# Patient Record
Sex: FEMALE | Race: WHITE | NOT HISPANIC OR LATINO | Employment: OTHER | URBAN - METROPOLITAN AREA
[De-identification: names, ages, dates, MRNs, and addresses within clinical notes are randomized per-mention and may not be internally consistent; named-entity substitution may affect disease eponyms.]

---

## 2024-11-11 ENCOUNTER — APPOINTMENT (OUTPATIENT)
Dept: NEUROLOGY | Facility: CLINIC | Age: 89
End: 2024-11-11
Payer: MEDICARE

## 2024-11-11 VITALS
SYSTOLIC BLOOD PRESSURE: 174 MMHG | DIASTOLIC BLOOD PRESSURE: 97 MMHG | HEART RATE: 64 BPM | RESPIRATION RATE: 18 BRPM | WEIGHT: 157 LBS

## 2024-11-11 DIAGNOSIS — G20.C PARKINSONISM, UNSPECIFIED PARKINSONISM TYPE (MULTI): Primary | ICD-10-CM

## 2024-11-11 PROCEDURE — 1160F RVW MEDS BY RX/DR IN RCRD: CPT | Performed by: PSYCHIATRY & NEUROLOGY

## 2024-11-11 PROCEDURE — 1159F MED LIST DOCD IN RCRD: CPT | Performed by: PSYCHIATRY & NEUROLOGY

## 2024-11-11 PROCEDURE — 1036F TOBACCO NON-USER: CPT | Performed by: PSYCHIATRY & NEUROLOGY

## 2024-11-11 PROCEDURE — 99205 OFFICE O/P NEW HI 60 MIN: CPT | Performed by: PSYCHIATRY & NEUROLOGY

## 2024-11-11 RX ORDER — IPRATROPIUM BROMIDE 42 UG/1
SPRAY, METERED NASAL
COMMUNITY

## 2024-11-11 RX ORDER — BUSPIRONE HYDROCHLORIDE 5 MG/1
5 TABLET ORAL 3 TIMES DAILY
COMMUNITY
Start: 2024-01-10

## 2024-11-11 RX ORDER — BISOPROLOL FUMARATE 5 MG/1
1 TABLET, FILM COATED ORAL
COMMUNITY
Start: 2024-02-02

## 2024-11-11 RX ORDER — NEBIVOLOL 10 MG/1
10 TABLET ORAL DAILY
COMMUNITY

## 2024-11-11 RX ORDER — CARBOXYMETHYLCELLULOSE SODIUM 5 MG/ML
1 SOLUTION/ DROPS OPHTHALMIC 4 TIMES DAILY
COMMUNITY
Start: 2023-09-07

## 2024-11-11 RX ORDER — LEVODOPA AND CARBIDOPA 95; 23.75 MG/1; MG/1
3 CAPSULE, EXTENDED RELEASE ORAL 3 TIMES DAILY
Qty: 200 CAPSULE | Refills: 0 | COMMUNITY
Start: 2024-11-11

## 2024-11-11 RX ORDER — ESCITALOPRAM OXALATE 20 MG/1
1 TABLET ORAL
COMMUNITY
Start: 2024-04-01

## 2024-11-11 RX ORDER — OMEPRAZOLE 20 MG/1
20 TABLET, DELAYED RELEASE ORAL DAILY
COMMUNITY

## 2024-11-11 RX ORDER — HYPROMELLOSE 2910 5 MG/ML
SOLUTION OPHTHALMIC
COMMUNITY
Start: 2024-05-17

## 2024-11-11 RX ORDER — GABAPENTIN 100 MG/1
100 CAPSULE ORAL
COMMUNITY
Start: 2024-03-05

## 2024-11-11 RX ORDER — CARBIDOPA AND LEVODOPA 25; 100 MG/1; MG/1
TABLET ORAL
COMMUNITY
End: 2024-11-11 | Stop reason: ALTCHOICE

## 2024-11-11 RX ORDER — BACLOFEN 5 MG/1
5 TABLET ORAL NIGHTLY
COMMUNITY

## 2024-11-11 RX ORDER — DICLOFENAC SODIUM 10 MG/G
GEL TOPICAL
COMMUNITY
Start: 2024-06-18

## 2024-11-11 ASSESSMENT — PATIENT HEALTH QUESTIONNAIRE - PHQ9
SUM OF ALL RESPONSES TO PHQ9 QUESTIONS 1 AND 2: 0
2. FEELING DOWN, DEPRESSED OR HOPELESS: NOT AT ALL
1. LITTLE INTEREST OR PLEASURE IN DOING THINGS: NOT AT ALL

## 2024-11-11 ASSESSMENT — UNIFIED PARKINSONS DISEASE RATING SCALE (UPDRS)
KINETIC_TREMOR_RIGHTHAND: 0
AMPLITUDE_LLE: 0
TOETAPPING_RIGHT: 0
POSTURAL_STABILITY: 2
CONSTANCY_TREMOR_ATREST: 0
RIGIDITY_NECK: 1
PARKINSONS_MEDS: YES
HANDMOVEMENTS_RIGHT: 1
FINGER_TAPPING_LEFT: 3
CHAIR_RISING_SCALE: 3
TOTAL_SCORE: 34
PRONATION_SUPINATION_LEFT: 3
HOEHN_YAHR: 3
FREEZING_GAIT: 0
POSTURAL_TREMOR_RIGHTHAND: 1
LEVODOPA: YES
RIGIDITY_RUE: 2
LEG_AGILITY_LEFT: 1
SPONTANEITY_OF_MOVEMENT: 2
DYSKINESIAS_PRESENT: NO
POSTURE: 1
RIGIDITY_LLE: 1
RIGIDITY_LUE: 2
SPEECH: 1
PRONATION_SUPINATION_RIGHT: 0
KINETIC_TREMOR_LEFTHAND: 0
RIGIDITY_RLE: 1
GAIT: 2
LEG_AGILITY_RIGHT: 0
AMPLITUDE_LUE: 0
TOETAPPING_LEFT: 1
AMPLITUDE_RUE: 0
POSTURAL_TREMOR_LEFTHAND: 1
MINUTES_SINCE_LEVODOPA: 240
AMPLITUDE_RLE: 0
CLINICAL_STATE: ON
FACIAL_EXPRESSION: 1
FINGER_TAPPING_RIGHT: 2
AMPLITUDE_LIP_JAW: 0

## 2024-11-11 ASSESSMENT — ENCOUNTER SYMPTOMS
OCCASIONAL FEELINGS OF UNSTEADINESS: 1
DEPRESSION: 0
LOSS OF SENSATION IN FEET: 1

## 2024-11-11 NOTE — PATIENT INSTRUCTIONS
Parkinsonism, possible Parkinson's disease   Possible mild R foot drop from lumbar radiculopathy    --Stop carbidopa/levodopa 25/100   --Try Rytary 23.75/95mg 2 tabs 3x/day for 1 week then 3 tabs 3x/day  --Follow up with Dr. Forman

## 2024-11-11 NOTE — LETTER
November 11, 2024     Rut Meehan MD  [id number]  [address]  [facility] [address]    Patient: Darrel León   YOB: 1935   Date of Visit: 11/11/2024       Dear Dr. Rut Meehan MD:    Thank you for referring Darrel León to me for evaluation. Below are my notes for this consultation.  If you have questions, please do not hesitate to call me. I look forward to following your patient along with you.       Sincerely,     Tyler Dueñas MD      CC: No Recipients  ______________________________________________________________________________________    [facility]     Subjective     Darrel León is a   89 y.o. year old female who presents with parkinsonism. Patient is accompanied by: child her daughter  and son.  Her daughter translated.    Visit type: new patient visit - second opinion    HPI    Patient Health Questionnaire-2 Score: 0      July 2023 she had onset of gait disorder, low back pain, and weakness of the legs.  Poor balance.  She fell several times and uses a walker.  In 2021 she had started to fall.  She shuffles her feet.  No hypophonia.  Immobility.  Some micrographia.  Maybe R hand tremor at the time.      carbidopa/levodopa 25/100 1 tab TID.  The mouth gets dry.  No benefit.  Dr. Forman tried raising it to 1.5 tabs TID, dry mouth got worse.  She feels stiff in the low back.  Some LBP.  No shooting pains.  No numbness.    She lives alone.  Some anosmia.    Memory and cognition are normal.  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    PMH: htn, cardiac catheterization, hyperlipidemia    There is no problem list on file for this patient.     No past medical history on file.   No past surgical history on file.   Social History     Socioeconomic History   • Marital status:      Spouse name: Not on file   • Number of children: Not on file   • Years of education: Not on file   • Highest education level: Not on file   Occupational History   •  Not on file   Tobacco Use   • Smoking status: Never   • Smokeless tobacco: Never   Substance and Sexual Activity   • Alcohol use: Not on file   • Drug use: Not on file   • Sexual activity: Not on file   Other Topics Concern   • Not on file   Social History Narrative   • Not on file     Social Drivers of Health     Financial Resource Strain: Not on file   Food Insecurity: Not on file   Transportation Needs: Not on file   Physical Activity: Not on file   Stress: Not on file   Social Connections: Not on file   Intimate Partner Violence: Not on file   Housing Stability: Not on file      No family history on file.   Patient Health Questionnaire-2 Score: 0          Review of Systems  All other system have been reviewed and are negative for complaint.  Objective     Vitals:    11/11/24 1620 11/11/24 1624   BP: 180/80 (!) 174/97   BP Location: Left arm Left arm   Patient Position: Sitting Standing   BP Cuff Size: Large adult Large adult   Pulse:  64   Resp: 18    Weight: 71.2 kg (157 lb)         Neurological Exam      GENERAL APPEARANCE:  No distress, alert and cooperative.     CARDIOVASCULAR: Radial pulses +2     MENTAL STATE:  Orientation was normal to time, place and person. Recent and remote memory was grossly intact.  Attention span and concentration were normal. Language was Brazilian. Calculation was generally intact.     OPHTHALMOSCOPIC: Deferred due to Covid19    CRANIAL NERVES:  Cranial nerves were normal.      CN 2- MARLEE, visual fields full to confrontation.      CN 3, 4, 6-  EOMs normal alignment, full range of movement, no nystagmus     CN 5- Facial sensation intact bilaterally.      CN 7- Normal and symmetric facial strength. Nasolabial folds symmetric.     CN 8- Hearing intact to finger rub.      CN 9- Palate elevates symmetrically.      CN 11- Normal strength of shoulder shrug and neck turning      CN 12- Tongue midline, with normal bulk and strength; no fasciculations.     MOTOR:  Motor exam was normal.  Muscle bulk was normal in both upper and lower extremities. Muscle strength was 5/5 in distal and proximal muscles in both upper and lower extremities. No fasciculations, tremor or other abnormal movements were present.     REFLEXES:  Right/ Left:  Biceps 2+/2, brachioradialis 2+/2, triceps 2+/2, patellar 2++/2+, ankle 1/1  Babinski: toes downgoing to plantar stimulation. No clonus, frontal release signs or other pathologic reflexes present.     SENSORY: Sensory exam was intact to light touch, sharp/dull, vibration     COORDINATION:  finger-nose-finger was intact     GAIT: She stood with difficulty, walked with a widened, shuffling, antalgic gait, with some steppage component on the right side suggestive of some subclinical foot drop on the right.  MDS UPDRS 1st Score: Motor Examination  Is the patient on medication for treating the symptoms of Parkinson's Disease?: Yes  Patients receiving medication for treating the symptoms of Parkinson's Disease, daryl the patient's clinical state.: On  Is the patient on Levodopa?: Yes  Minutes since last Levodopa dose: 240  Speech: 1  Facial Expression: 1  Rigidty Neck: 1  Rigidty RUE: 2  Rigidity - LUE: 2  Rigidity RLE: 1  Rigidity LLE: 1  Finger Tapping Right Hand: 2  Finger Tapping Left Hand: 3  Hand Movements- Right Hand: 1  Hand Movements- Left Hand: 2  Pronatiaon-Supination Movments - Right Hand: 0  Pronatiaon-Supination Movments Left Hand: 3  Toe Tapping Right Foot: 0  Toe Tapping - Left Foot: 1  Leg Agility - Right Le  Leg Agility - Left le  Arising from Chair: 3  Gait: 2  Freezing of Gait: 0  Postural Stability: 2  Posture: 1  Global Spontanteity of Movment ( Body Bradykinesia): 2  Postural Tremor - Right Hand: 1  Postural Tremor - Left hand: 1  Kinetic Tremor - Right hand: 0  Kinetic Tremor - Left hand: 0  Rest Tremor Amplitude - RUE: 0  Rest Tremor Amplitude - LUE: 0  Rest Tremor Amplitude - RLE: 0  Rest Tremor Amplitude - LLE: 0  Rest Tremor Amplitude -  Lip/Jaw: 0  Constancy of Rest Tremor: 0  MDS UPDRS Total Score: 34  Were dyskinesias (chorea or dystonia) present during examination?: No  Hoen and Yahr Stage: 3           Assessment/Plan       Darrel León is a 89 y.o. year old female here for a second opinion, for parkinsonism.  She sees Dr. Forman at [facility].  Dr. Forman did a DaTscan that was positive, and was unable to titrate levodopa because of dry mouth.  I asked her whether this was severe enough that it is dose-limiting, and she thought that it is.  We therefore decided to try Rytary, since she has dry mouth after each dose, I was interested whether a slow release formulation would circumvent this side effect and proved to be titratable.  If the samples I gave her work out, then I offered she could call the office and we could send in a prescription and get PA.  I prescribed Rytary 95 mg, to be titrated up to 3 tabs 3 times daily after discontinuing her Sinemet.  She has already done extensive physical therapy, and I suggested possibility of occupational therapy at [facility] for this mild foot drop.  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  She does have very mild right-sided hyperreflexia, but that can happen with PD and her brain scan report was unrevealing.    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  Parkinson's disease remains a possibility.  Her family asked about stiff person syndrome.  She does not have the classic gait, and her low back stiffness and pain may be better explained by her lumbar spine pathology.  Nevertheless, I deferred to Dr. Forman about whether to send LORENZO and  amphiphysin antibodies if she fails to respond to a higher levodopa dose.  Diagnostically, I brought up  skin punch biopsy as a confirmatory test, but she did not seem so interested.    Overall, I agreed with her management to date.  She will follow-up with her neurologist.    Recommendations:  --Stop carbidopa/levodopa 25/100   --Try Rytary 23.75/95mg 2 tabs 3x/day for 1 week then 3 tabs 3x/day  --Follow up with Dr. Dasia Dueñas MD, FAAN  [facility]  [facility]  , Neurology  [facility]       60 minutes total were spent reviewing prior medical records, reviewing MRI reports, face-to-face with the patient, and on documentation, as well as obtaining and logging samples.

## 2024-11-11 NOTE — PROGRESS NOTES
[facility]     Subjective     Darrel León is a   89 y.o. year old female who presents with parkinsonism. Patient is accompanied by: child her daughter  and son.  Her daughter translated.    Visit type: new patient visit - second opinion    HPI    Patient Health Questionnaire-2 Score: 0      July 2023 she had onset of gait disorder, low back pain, and weakness of the legs.  Poor balance.  She fell several times and uses a walker.  In 2021 she had started to fall.  She shuffles her feet.  No hypophonia.  Immobility.  Some micrographia.  Maybe R hand tremor at the time.      carbidopa/levodopa 25/100 1 tab TID.  The mouth gets dry.  No benefit.  Dr. Forman tried raising it to 1.5 tabs TID, dry mouth got worse.  She feels stiff in the low back.  Some LBP.  No shooting pains.  No numbness.    She lives alone.  Some anosmia.    Memory and cognition are normal.  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    PMH: htn, cardiac catheterization, hyperlipidemia    There is no problem list on file for this patient.     No past medical history on file.   No past surgical history on file.   Social History     Socioeconomic History    Marital status:      Spouse name: Not on file    Number of children: Not on file    Years of education: Not on file    Highest education level: Not on file   Occupational History    Not on file   Tobacco Use    Smoking status: Never    Smokeless tobacco: Never   Substance and Sexual Activity    Alcohol use: Not on file    Drug use: Not on file    Sexual activity: Not on file   Other Topics Concern    Not on file   Social History Narrative    Not on file     Social Drivers of Health     Financial Resource Strain: Not on file   Food Insecurity: Not on file   Transportation Needs: Not on file   Physical Activity: Not on file   Stress: Not on file   Social Connections: Not on file   Intimate Partner Violence: Not on file   Housing Stability: Not on file      No family  history on file.   Patient Health Questionnaire-2 Score: 0          Review of Systems  All other system have been reviewed and are negative for complaint.  Objective     Vitals:    11/11/24 1620 11/11/24 1624   BP: 180/80 (!) 174/97   BP Location: Left arm Left arm   Patient Position: Sitting Standing   BP Cuff Size: Large adult Large adult   Pulse:  64   Resp: 18    Weight: 71.2 kg (157 lb)         Neurological Exam      GENERAL APPEARANCE:  No distress, alert and cooperative.     CARDIOVASCULAR: Radial pulses +2     MENTAL STATE:  Orientation was normal to time, place and person. Recent and remote memory was grossly intact.  Attention span and concentration were normal. Language was Latvian. Calculation was generally intact.     OPHTHALMOSCOPIC: Deferred due to Covid19    CRANIAL NERVES:  Cranial nerves were normal.      CN 2- MARLEE, visual fields full to confrontation.      CN 3, 4, 6-  EOMs normal alignment, full range of movement, no nystagmus     CN 5- Facial sensation intact bilaterally.      CN 7- Normal and symmetric facial strength. Nasolabial folds symmetric.     CN 8- Hearing intact to finger rub.      CN 9- Palate elevates symmetrically.      CN 11- Normal strength of shoulder shrug and neck turning      CN 12- Tongue midline, with normal bulk and strength; no fasciculations.     MOTOR:  Motor exam was normal. Muscle bulk was normal in both upper and lower extremities. Muscle strength was 5/5 in distal and proximal muscles in both upper and lower extremities. No fasciculations, tremor or other abnormal movements were present.     REFLEXES:  Right/ Left:  Biceps 2+/2, brachioradialis 2+/2, triceps 2+/2, patellar 2++/2+, ankle 1/1  Babinski: toes downgoing to plantar stimulation. No clonus, frontal release signs or other pathologic reflexes present.     SENSORY: Sensory exam was intact to light touch, sharp/dull, vibration     COORDINATION:  finger-nose-finger was intact     GAIT: She stood with  difficulty, walked with a widened, shuffling, antalgic gait, with some steppage component on the right side suggestive of some subclinical foot drop on the right.  MDS UPDRS 1st Score: Motor Examination  Is the patient on medication for treating the symptoms of Parkinson's Disease?: Yes  Patients receiving medication for treating the symptoms of Parkinson's Disease, daryl the patient's clinical state.: On  Is the patient on Levodopa?: Yes  Minutes since last Levodopa dose: 240  Speech: 1  Facial Expression: 1  Rigidty Neck: 1  Rigidty RUE: 2  Rigidity - LUE: 2  Rigidity RLE: 1  Rigidity LLE: 1  Finger Tapping Right Hand: 2  Finger Tapping Left Hand: 3  Hand Movements- Right Hand: 1  Hand Movements- Left Hand: 2  Pronatiaon-Supination Movments - Right Hand: 0  Pronatiaon-Supination Movments Left Hand: 3  Toe Tapping Right Foot: 0  Toe Tapping - Left Foot: 1  Leg Agility - Right Le  Leg Agility - Left le  Arising from Chair: 3  Gait: 2  Freezing of Gait: 0  Postural Stability: 2  Posture: 1  Global Spontanteity of Movment ( Body Bradykinesia): 2  Postural Tremor - Right Hand: 1  Postural Tremor - Left hand: 1  Kinetic Tremor - Right hand: 0  Kinetic Tremor - Left hand: 0  Rest Tremor Amplitude - RUE: 0  Rest Tremor Amplitude - LUE: 0  Rest Tremor Amplitude - RLE: 0  Rest Tremor Amplitude - LLE: 0  Rest Tremor Amplitude - Lip/Jaw: 0  Constancy of Rest Tremor: 0  MDS UPDRS Total Score: 34  Were dyskinesias (chorea or dystonia) present during examination?: No  Hoen and Yahr Stage: 3           Assessment/Plan       Darrel León is a 89 y.o. year old female here for a second opinion, for parkinsonism.  She sees Dr. Forman at [facility].  Dr. Forman did a DaTscan that was positive, and was unable to titrate levodopa because of dry mouth.  I asked her whether this was severe enough that it is dose-limiting, and she thought that it is.  We therefore decided to try Rytary, since she has dry mouth after each dose, I was  interested whether a slow release formulation would circumvent this side effect and proved to be titratable.  If the samples I gave her work out, then I offered she could call the office and we could send in a prescription and get PA.  I prescribed Rytary 95 mg, to be titrated up to 3 tabs 3 times daily after discontinuing her Sinemet.  She has already done extensive physical therapy, and I suggested possibility of occupational therapy at [facility] for this mild foot drop.  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  She does have very mild right-sided hyperreflexia, but that can happen with PD and her brain scan report was unrevealing.    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  Parkinson's disease remains a possibility.  Her family asked about stiff person syndrome.  She does not have the classic gait, and her low back stiffness and pain may be better explained by her lumbar spine pathology.  Nevertheless, I deferred to Dr. Forman about whether to send LORENZO and  amphiphysin antibodies if she fails to respond to a higher levodopa dose.  Diagnostically, I brought up skin punch biopsy as a confirmatory test, but she did not seem so interested.    Overall, I agreed with her management to date.  She will follow-up with her neurologist.    Recommendations:  --Stop carbidopa/levodopa 25/100   --Try Rytary 23.75/95mg 2 tabs 3x/day for 1 week then 3 tabs 3x/day  --Follow up with Dr. Dasia Dueñas MD, FAAN  [facility]  [facility]  , Neurology  [facility]       60 minutes total were spent reviewing prior medical records,  reviewing MRI reports, face-to-face with the patient, and on documentation, as well as obtaining and logging samples.